# Patient Record
(demographics unavailable — no encounter records)

---
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
## 2025-03-05 NOTE — PHYSICAL EXAM
[de-identified] : Examination of the right shoulder is as follows: INSPECTION: swelling, ecchymosis, but no erythema, no atrophy, no deformity, no scapular winging. PALPATION: tenderness at proximal humerus, but no AC joint tenderness, no trapezius tenderness, no mass(es) palpated. ROM: active forward flexion 15 degrees, active abduction 10 degrees, internal rotation lateral hip, external rotation 5 degrees. -motion is assessed: sitting -pain at end of ROM: severe STRENGTH: pain with strength testing, but forward flexion 3/5, abduction 3/5, external rotation 3/5, internal rotation 3/5 NEURO: motor and sensory intact distally.   X-rays of the right shoulder is as follows: Shoulder 2+ View AP/Lateral: nondisplaced proximal humerus fracture  Right Hand: INSPECTION: of the hand/wrist is as follows: wrist swelling, ecchymosis of dorsal wrist and volar wrist. PALPATION: of the hand/wrist is as follows: Tenderness over forearm and distal radius RANGE OF MOTION: Wrist dorsiflexion to 10 degrees. Wrist volarflexion to 10 degrees. STRENGTH: Wrist dorsiflexion strength is 3/5. Wrist volarflexion strength is 3/5. Grasp strength is 3/5 NEUROLOGICAL: Motor and sensory function intact in radial, ulnar and median nerve distribution, no focal motor deficits, light touch intact throughout, palpable radial pulse and good capillary refill in all fingers.   X-rays of the right wrist is as follows: Wrist 3 View AP/Lateral/Oblique: displaced both bones forearm fracture, nondisplaced distal radius fracture